# Patient Record
Sex: FEMALE | Race: WHITE
[De-identification: names, ages, dates, MRNs, and addresses within clinical notes are randomized per-mention and may not be internally consistent; named-entity substitution may affect disease eponyms.]

---

## 2017-04-24 ENCOUNTER — HOSPITAL ENCOUNTER (OUTPATIENT)
Dept: HOSPITAL 24 - RAD | Age: 62
End: 2017-04-24
Attending: INTERNAL MEDICINE
Payer: COMMERCIAL

## 2017-04-24 DIAGNOSIS — Z12.31: Primary | ICD-10-CM

## 2017-04-24 PROCEDURE — 77067 SCR MAMMO BI INCL CAD: CPT

## 2017-04-24 NOTE — MG
HISTORY:  SCREENING 



Comparison: 01/11/2016, 01/07/2014, 09/18/2012



FINDINGS: 

Bilateral CC and MLO projections of the right and left breast were obtained.  Scattered fibroglandul
ar tissue is seen to be present.  No significant architectural distortion, mass or clustered microca
lcifications can be observed to suggest malignancy.  No skin thickening or nipple retraction is appr
eciated.   No pathological lymphadenopathy can be identified. Stable scattered benign-appearing calc
ifications in the left and right breasts.

 

IMPRESSION:  NO RADIOGRAPHIC EVIDENCE OF MALIGNANCY.  

 

ACR CATEGORY 2: Benign findings.

 

FOLLOW-UP EXAM 1 YEAR. 



Diagnostic CAD was utilized and reviewed.



* 0 (ZERO) - ASSESSMENT INCOMPLETE; ADDITIONAL IMAGING IS NEEDED. 

* 1/1 (ONE) - NEGATIVE. 

* 2/II (TWO) - BENIGN FINDINGS.

 * 3/III (THREE) - PROBABLY BENIGN FINDING; SHORT INTERVAL FOLLOW-UP

SUGGESTED.

 * 4/IV (FOUR) - SUSPICIOUS ABNORMALITY; BIOPSY SHOULD BE CONSIDERED.

 * 5/V - HIGHLY SUSPICIOUS OF MALIGNANCY; BIOPSY SHOULD BE PERFORMED.

 

A NEGATIVE X-RAY REPORT SHOULD NOT DELAY BIOPSY IF A DOMINANT OR

CLINICALLY SUSPICIOUS MASS IS PRESENT; 4 TO 8 PERCENT OF CANCERS ARE NOT IDENTIFIED BY X-RAY.  A NEG
ATIVE REPORT MAY REINFORCE THE CLINICAL IMPRESSION.  ADENOSIS AND DENSE BREASTS MAY OBSCURE AN UNDER
LYING NEOPLASM.





Reported By:Electronically Signed by MEJIA BHAT MD at 4/24/2017 12:32:55 PM

## 2018-05-16 ENCOUNTER — HOSPITAL ENCOUNTER (OUTPATIENT)
Dept: HOSPITAL 24 - RAD | Age: 63
End: 2018-05-16
Attending: INTERNAL MEDICINE
Payer: COMMERCIAL

## 2018-05-16 DIAGNOSIS — Z12.31: Primary | ICD-10-CM

## 2018-05-16 PROCEDURE — 77067 SCR MAMMO BI INCL CAD: CPT

## 2018-05-17 NOTE — MG
HISTORY:  SCREENING 



Comparison: Multiple priors dating back to December 5, 2013



FINDINGS: 



Bilateral CC and MLO projections of the right and left breast were obtained.  Heterogeneously dense f
ibroglandular tissue is seen to be present without significant interval change.  No suspicious jace
ectural distortion, mass or clustered microcalcifications can be observed to suggest malignancy.  No 
skin thickening or nipple retraction is appreciated.   No pathological lymphadenopathy can be identif
ied. Benign-appearing calcifications are noted within the right and left breast.

 

IMPRESSION:  NO RADIOGRAPHIC EVIDENCE OF MALIGNANCY.  

 

ACR CATEGORY 2 - benign findings.

 

FOLLOW-UP EXAM 1 YEAR. 



Diagnostic CAD was utilized and reviewed.



* 0 (ZERO) - ASSESSMENT INCOMPLETE; ADDITIONAL IMAGING IS NEEDED. 

* 1/1 (ONE) - NEGATIVE. 

* 2/II (TWO) - BENIGN FINDINGS.

 * 3/III (THREE) - PROBABLY BENIGN FINDING; SHORT INTERVAL FOLLOW-UP

SUGGESTED.

 * 4/IV (FOUR) - SUSPICIOUS ABNORMALITY; BIOPSY SHOULD BE CONSIDERED.

 * 5/V - HIGHLY SUSPICIOUS OF MALIGNANCY; BIOPSY SHOULD BE PERFORMED.

 

A NEGATIVE X-RAY REPORT SHOULD NOT DELAY BIOPSY IF A DOMINANT OR

CLINICALLY SUSPICIOUS MASS IS PRESENT; 4 TO 8 PERCENT OF CANCERS ARE NOT IDENTIFIED BY X-RAY.  A NEGA
TIVE REPORT MAY REINFORCE THE CLINICAL IMPRESSION.  ADENOSIS AND DENSE BREASTS MAY OBSCURE AN UNDERLY
ING NEOPLASM.





Reported By:Electronically Signed by CHRISTIANNE GARCIA MD at 5/17/2018 4:48:48 PM